# Patient Record
Sex: FEMALE | Race: BLACK OR AFRICAN AMERICAN | Employment: FULL TIME | URBAN - METROPOLITAN AREA
[De-identification: names, ages, dates, MRNs, and addresses within clinical notes are randomized per-mention and may not be internally consistent; named-entity substitution may affect disease eponyms.]

---

## 2021-05-26 ENCOUNTER — OFFICE VISIT (OUTPATIENT)
Dept: PRIMARY CARE CLINIC | Age: 46
End: 2021-05-26
Payer: COMMERCIAL

## 2021-05-26 VITALS
OXYGEN SATURATION: 97 % | DIASTOLIC BLOOD PRESSURE: 89 MMHG | TEMPERATURE: 97.8 F | RESPIRATION RATE: 18 BRPM | SYSTOLIC BLOOD PRESSURE: 134 MMHG | HEART RATE: 79 BPM | BODY MASS INDEX: 46 KG/M2 | HEIGHT: 63 IN | WEIGHT: 259.6 LBS

## 2021-05-26 DIAGNOSIS — Z12.31 ENCOUNTER FOR SCREENING MAMMOGRAM FOR MALIGNANT NEOPLASM OF BREAST: ICD-10-CM

## 2021-05-26 DIAGNOSIS — G43.009 MIGRAINE WITHOUT AURA AND WITHOUT STATUS MIGRAINOSUS, NOT INTRACTABLE: Primary | ICD-10-CM

## 2021-05-26 DIAGNOSIS — Z11.59 NEED FOR HEPATITIS C SCREENING TEST: ICD-10-CM

## 2021-05-26 DIAGNOSIS — E55.9 VITAMIN D DEFICIENCY: ICD-10-CM

## 2021-05-26 DIAGNOSIS — K59.04 CHRONIC IDIOPATHIC CONSTIPATION: ICD-10-CM

## 2021-05-26 DIAGNOSIS — E66.01 MORBIDLY OBESE (HCC): ICD-10-CM

## 2021-05-26 DIAGNOSIS — Z00.00 PHYSICAL EXAM: ICD-10-CM

## 2021-05-26 DIAGNOSIS — K21.9 GASTROESOPHAGEAL REFLUX DISEASE WITHOUT ESOPHAGITIS: ICD-10-CM

## 2021-05-26 PROBLEM — Z90.710 S/P HYSTERECTOMY: Status: ACTIVE | Noted: 2021-05-26

## 2021-05-26 PROCEDURE — 99204 OFFICE O/P NEW MOD 45 MIN: CPT | Performed by: INTERNAL MEDICINE

## 2021-05-26 RX ORDER — OMEPRAZOLE 40 MG/1
40 CAPSULE, DELAYED RELEASE ORAL DAILY
Qty: 30 CAPSULE | Refills: 0 | Status: SHIPPED | OUTPATIENT
Start: 2021-05-26 | End: 2021-06-23

## 2021-05-26 RX ORDER — TOPIRAMATE 25 MG/1
25 TABLET ORAL 2 TIMES DAILY
Qty: 60 TABLET | Refills: 0 | Status: SHIPPED | OUTPATIENT
Start: 2021-05-26 | End: 2021-06-23

## 2021-07-09 ENCOUNTER — HOSPITAL ENCOUNTER (OUTPATIENT)
Dept: MAMMOGRAPHY | Age: 46
Discharge: HOME | End: 2021-07-09
Payer: COMMERCIAL

## 2021-07-09 ENCOUNTER — TRANSCRIBE ORDER (OUTPATIENT)
Dept: GENERAL RADIOLOGY | Age: 46
End: 2021-07-09

## 2021-07-09 DIAGNOSIS — Z12.31 VISIT FOR SCREENING MAMMOGRAM: Primary | ICD-10-CM

## 2021-07-09 DIAGNOSIS — Z12.31 VISIT FOR SCREENING MAMMOGRAM: ICD-10-CM

## 2021-07-09 PROCEDURE — 77063 BREAST TOMOSYNTHESIS BI: CPT

## 2021-07-19 ENCOUNTER — VIRTUAL VISIT (OUTPATIENT)
Dept: PRIMARY CARE CLINIC | Age: 46
End: 2021-07-19
Payer: COMMERCIAL

## 2021-07-19 DIAGNOSIS — G43.009 MIGRAINE WITHOUT AURA AND WITHOUT STATUS MIGRAINOSUS, NOT INTRACTABLE: ICD-10-CM

## 2021-07-19 DIAGNOSIS — E66.01 MORBIDLY OBESE (HCC): ICD-10-CM

## 2021-07-19 DIAGNOSIS — F10.90 DRINKING PROBLEM: ICD-10-CM

## 2021-07-19 DIAGNOSIS — E55.9 VITAMIN D DEFICIENCY: ICD-10-CM

## 2021-07-19 DIAGNOSIS — R74.8 ELEVATED LIVER ENZYMES: Primary | ICD-10-CM

## 2021-07-19 DIAGNOSIS — R76.8 HEPATITIS C ANTIBODY TEST POSITIVE: ICD-10-CM

## 2021-07-19 PROCEDURE — 99214 OFFICE O/P EST MOD 30 MIN: CPT | Performed by: INTERNAL MEDICINE

## 2021-07-19 RX ORDER — ERGOCALCIFEROL 1.25 MG/1
50000 CAPSULE ORAL
Qty: 4 CAPSULE | Refills: 0 | Status: SHIPPED | OUTPATIENT
Start: 2021-07-19 | End: 2021-08-16

## 2021-07-19 NOTE — PROGRESS NOTES
Sigrid Cespedes (: 1975) is a 40 y.o. female, established patient, here for evaluation of the following chief complaint(s):   Follow-up   Written by Diana Moeller, as dictated by Dr. Jany Valera MD.      ASSESSMENT/PLAN:  Below is the assessment and plan developed based on review of pertinent labs, studies, and medications. 1. Elevated liver enzymes  D/c alcohol intake for 2 weeks. Will recheck metabolic panel to see if liver enzyme levels improve with alcohol reduction.  -     METABOLIC PANEL, COMPREHENSIVE; Future    2. Drinking problem  D/c alcohol intake for 2 weeks. Will recheck metabolic panel to see if liver enzyme levels improve with alcohol reduction. 3. Hepatitis C antibody test positive  Ordered lab work to check viral load of Hepatitis C. Waiting for results. Will consider tx if viral load is high.  -     HEPATITIS C QT BY PCR WITH REFLEX GENOTYPE; Future    4. Vitamin D deficiency  Prescribed ergocalciferol 1,250 mcg. Take medication as prescribed. Recommend taking a OTC Vitamin D supplement of 1,000 units daily after prescription finishes. Ordered lab work to re-check Vitamin D levels. Waiting for results. -     ergocalciferol (ERGOCALCIFEROL) 1,250 mcg (50,000 unit) capsule; Take 1 Capsule by mouth every seven (7) days for 4 doses. , Normal, Disp-4 Capsule, R-0 sent to pharmacy. -     VITAMIN D, 25 HYDROXY; Future    5. Morbidly obese ([facility] [address]  Advised pt to purchase a FitBit or similar device. Discussed that a healthy lifestyle requires 5,000-7,000 steps daily, but weight loss requires 10,000 steps daily. 6. Migraine without aura and without status migrainosus, not intractable  Continue taking Topamax 25 mg as prescribed for headaches. SUBJECTIVE/OBJECTIVE:  HPI    Patient presents today virtually to discuss lab results. Her most recent lab work on 21 showed ALT at 108, and AST at 115.  She has mixed drinks during social events, and was drinking 3x last week. She denies any urges to drinking alcohol. Her Hepatitis C test was reactive. Her Vitamin D level came  9.8. She c/o rapid weight gain recently. Her weight on 5/26/21 was 259 lb. She takes Topamax for migraines. She continues to get intermittent migraines, but they have reduced in intensity. Patient Active Problem List   Diagnosis Code    S/P hysterectomy Z90.710    Migraine without aura and without status migrainosus, not intractable G43.009    Chronic idiopathic constipation K59.04        Current Outpatient Medications on File Prior to Visit   Medication Sig Dispense Refill    topiramate (TOPAMAX) 25 mg tablet TAKE 1 TABLET BY MOUTH TWICE DAILY 90 Tablet 0    omeprazole (PRILOSEC) 40 mg capsule TAKE 1 CAPSULE BY MOUTH DAILY 30 Capsule 2     No current facility-administered medications on file prior to visit. No Known Allergies    No past medical history on file. No past surgical history on file. Family History   Problem Relation Age of Onset    Hypertension Father     Cancer Maternal Grandmother     Cancer Maternal Grandfather     Cancer Paternal Grandmother     Cancer Paternal Grandfather        Social History     Socioeconomic History    Marital status:      Spouse name: Not on file    Number of children: Not on file    Years of education: Not on file    Highest education level: Not on file   Occupational History    Not on file   Tobacco Use    Smoking status: Never Smoker    Smokeless tobacco: Never Used   Vaping Use    Vaping Use: Never used   Substance and Sexual Activity    Alcohol use:  Yes    Drug use: Never    Sexual activity: Not on file   Other Topics Concern    Not on file   Social History Narrative    Not on file     Social Determinants of Health     Financial Resource Strain:     Difficulty of Paying Living Expenses:    Food Insecurity:     Worried About Running Out of Food in the Last Year:     [address] in the Last Year: Transportation Needs:     Lack of Transportation (Medical):  Lack of Transportation (Non-Medical):    Physical Activity:     Days of Exercise per Week:     Minutes of Exercise per Session:    Stress:     Feeling of Stress :    Social Connections:     Frequency of Communication with Friends and Family:     Frequency of Social Gatherings with Friends and Family:     Attends Jain Services:     Active Member of Clubs or Organizations:     Attends Club or Organization Meetings:     Marital Status:    Intimate Partner Violence:     Fear of Current or Ex-Partner:     Emotionally Abused:     Physically Abused:     Sexually Abused:        Orders Only on 07/09/2021   Component Date Value Ref Range Status    Hepatitis C virus Ab 07/09/2021 >11.00  Index Final    Hep C virus Ab Interp. 07/09/2021 REACTIVE* NONREACTIVE   Final    Comment: Recommend equivocal and reactive anti-HCV results be further evaluated by a  supplemental or confirmatory test, such as quantitative  nucleic acid test for  HCV RNA, if clinically indicated.  Hep C  virus Ab comment 07/09/2021 Method used is Siemens Advia Centaur    Final    Vitamin D 25-Hydroxy 07/09/2021 9.8* 30 - 100 ng/mL Final    Comment: (NOTE)  Deficiency               <20 ng/mL  Insufficiency          20-30 ng/mL  Sufficient             ng/mL  Possible toxicity       >100 ng/mL    The Method used is Siemens Advia Centaur currently standardized to a   Center of Disease Control and Prevention (CDC) certified reference   [facility]. Samples containing fluorescein dye can produce falsely   elevated values when tested with the ADVIA Centaur Vitamin D Assay. It is recommended that results in the toxic range, >100 ng/mL, be   retested 72 hours post fluorescein exposure.       TSH 07/09/2021 2.29  0.36 - 3.74 uIU/mL Final    Comment:   Due to TSH heterogeneity, both structurally and degree of glycosylation,  monoclonal antibodies used in the TSH assay may not accurately quantitate TSH. Therefore, this result should be correlated with clinical findings as well as  with other assessments of thyroid function, e.g., free T4, free T3.  Cholesterol, total 07/09/2021 182  <200 MG/DL Final    Triglyceride 07/09/2021 107  <150 MG/DL Final    Comment: Based on NCEP-ATP III:  Triglycerides <150 mg/dL  is considered normal, 150-199  mg/dL  borderline high,  200-499 mg/dL high and  greater than or equal to 500  mg/dL very high.  HDL Cholesterol 07/09/2021 87  MG/DL Final    Comment: Based on NCEP ATP III, HDL Cholesterol <40 mg/dL is considered low and >60  mg/dL is elevated.       LDL, calculated 07/09/2021 73.6  0 - 100 MG/DL Final    Comment: Based on the NCEP-ATP: LDL-C concentrations are considered  optimal <100 mg/dL,  near optimal/above Normal 100-129 mg/dL Borderline High: 130-159, High: 160-189  mg/dL Very High: Greater than or equal to 190 mg/dL      VLDL, calculated 07/09/2021 21.4  MG/DL Final    CHOL/HDL Ratio 07/09/2021 2.1  0.0 - 5.0   Final    WBC 07/09/2021 5.3  3.6 - 11.0 K/uL Final    RBC 07/09/2021 4.27  3.80 - 5.20 M/uL Final    HGB 07/09/2021 13.2  11.5 - 16.0 g/dL Final    HCT 07/09/2021 40.2  35.0 - 47.0 % Final    MCV 07/09/2021 94.1  80.0 - 99.0 FL Final    MCH 07/09/2021 30.9  26.0 - 34.0 PG Final    MCHC 07/09/2021 32.8  30.0 - 36.5 g/dL Final    RDW 07/09/2021 13.2  11.5 - 14.5 % Final    PLATELET 41/88/8645 386  150 - 400 K/uL Final    MPV 07/09/2021 11.3  8.9 - 12.9 FL Final    NRBC 07/09/2021 0.0  0  WBC Final    ABSOLUTE NRBC 07/09/2021 0.00  0.00 - 0.01 K/uL Final    Sodium 07/09/2021 140  136 - 145 mmol/L Final    Potassium 07/09/2021 3.9  3.5 - 5.1 mmol/L Final    Chloride 07/09/2021 107  97 - 108 mmol/L Final    CO2 07/09/2021 26  21 - 32 mmol/L Final    Anion gap 07/09/2021 7  5 - 15 mmol/L Final    Glucose 07/09/2021 82  65 - 100 mg/dL Final    BUN 07/09/2021 12  6 - 20 MG/DL Final    Creatinine 07/09/2021 0.78  0.55 - 1.02 MG/DL Final    BUN/Creatinine ratio 07/09/2021 15  12 - 20   Final    GFR est AA 07/09/2021 >60  >60 ml/min/1.73m2 Final    GFR est non-AA 07/09/2021 >60  >60 ml/min/1.73m2 Final    Comment: Estimated GFR is calculated using the IDMS-traceable Modification of Diet in  Renal Disease (MDRD) Study equation, reported for both  Americans  (GFRAA) and non- Americans (GFRNA), and normalized to 1.73m2 body  surface area. The physician must decide which value applies to the patient.  Calcium 07/09/2021 9.2  8.5 - 10.1 MG/DL Final    Bilirubin, total 07/09/2021 0.4  0.2 - 1.0 MG/DL Final    ALT (SGPT) 07/09/2021 108* 12 - 78 U/L Final    AST (SGOT) 07/09/2021 115* 15 - 37 U/L Final    Alk. phosphatase 07/09/2021 96  45 - 117 U/L Final    Protein, total 07/09/2021 7.3  6.4 - 8.2 g/dL Final    Albumin 07/09/2021 3.8  3.5 - 5.0 g/dL Final    Globulin 07/09/2021 3.5  2.0 - 4.0 g/dL Final    A-G Ratio 07/09/2021 1.1  1.1 - 2.2   Final       Review of Systems   Constitutional: Positive for unexpected weight change. Negative for activity change and fatigue. HENT: Negative for congestion, hearing loss, rhinorrhea and sore throat. Eyes: Negative for discharge. Respiratory: Negative for cough, chest tightness and shortness of breath. Cardiovascular: Negative for leg swelling. Gastrointestinal: Negative for abdominal pain, constipation and diarrhea. Genitourinary: Negative for dysuria, flank pain, frequency and urgency. Musculoskeletal: Negative for arthralgias, back pain and myalgias. Skin: Negative for color change and rash. Neurological: Positive for headaches. Negative for dizziness and light-headedness. Psychiatric/Behavioral: Negative for dysphoric mood and sleep disturbance. The patient is not nervous/anxious.        Physical Exam    [INSTRUCTIONS:  \"[x]\" Indicates a positive item  \"[]\" Indicates a negative item  -- DELETE ALL ITEMS NOT EXAMINED]    Constitutional: [x] Appears well-developed and well-nourished [x] No apparent distress      [] Abnormal -     Mental status: [x] Alert and awake  [x] Oriented to person/place/time [x] Able to follow commands    [] Abnormal -     Eyes:   EOM    [x]  Normal    [] Abnormal -   Sclera  [x]  Normal    [] Abnormal -          Discharge [x]  None visible   [] Abnormal -     HENT: [x] Normocephalic, atraumatic  [] Abnormal -   [x] Mouth/Throat: Mucous membranes are moist    External Ears [x] Normal  [] Abnormal -    Neck: [x] No visualized mass [] Abnormal -     Pulmonary/Chest: [x] Respiratory effort normal   [x] No visualized signs of difficulty breathing or respiratory distress        [] Abnormal -      Musculoskeletal:   [x] Normal gait with no signs of ataxia         [x] Normal range of motion of neck        [] Abnormal -     Neurological:        [x] No Facial Asymmetry (Cranial nerve 7 motor function) (limited exam due to video visit)          [x] No gaze palsy        [] Abnormal -          Skin:        [x] No significant exanthematous lesions or discoloration noted on facial skin         [] Abnormal -            Psychiatric:       [x] Normal Affect [] Abnormal -        [x] No Hallucinations    Other pertinent observable physical exam findings:-            Neel Temple, was evaluated through a synchronous (real-time) audio-video encounter. The patient (or guardian if applicable) is aware that this is a billable service. Verbal consent to proceed has been obtained within the past 12 months. The visit was conducted pursuant to the emergency declaration under the [address] [place] [address] authority and the [employer] General Act. Patient identification was verified, and a caregiver was present when appropriate. The patient was located in a state where the provider was credentialed to provide care.       An electronic signature was used to authenticate this note.   -- Abida Dillard

## 2021-08-05 DIAGNOSIS — R76.8 HEPATITIS C ANTIBODY TEST POSITIVE: ICD-10-CM

## 2021-08-05 DIAGNOSIS — E55.9 VITAMIN D DEFICIENCY: ICD-10-CM

## 2021-08-05 DIAGNOSIS — R74.8 ELEVATED LIVER ENZYMES: ICD-10-CM

## 2021-08-05 LAB
25(OH)D3 SERPL-MCNC: 28.8 NG/ML (ref 30–100)
ALBUMIN SERPL-MCNC: 3.8 G/DL (ref 3.5–5)
ALBUMIN/GLOB SERPL: 1.1 {RATIO} (ref 1.1–2.2)
ALP SERPL-CCNC: 90 U/L (ref 45–117)
ALT SERPL-CCNC: 93 U/L (ref 12–78)
ANION GAP SERPL CALC-SCNC: 8 MMOL/L (ref 5–15)
AST SERPL-CCNC: 67 U/L (ref 15–37)
BILIRUB SERPL-MCNC: 0.4 MG/DL (ref 0.2–1)
BUN SERPL-MCNC: 9 MG/DL (ref 6–20)
BUN/CREAT SERPL: 10 (ref 12–20)
CALCIUM SERPL-MCNC: 9.1 MG/DL (ref 8.5–10.1)
CHLORIDE SERPL-SCNC: 106 MMOL/L (ref 97–108)
CO2 SERPL-SCNC: 26 MMOL/L (ref 21–32)
CREAT SERPL-MCNC: 0.9 MG/DL (ref 0.55–1.02)
GLOBULIN SER CALC-MCNC: 3.4 G/DL (ref 2–4)
GLUCOSE SERPL-MCNC: 81 MG/DL (ref 65–100)
POTASSIUM SERPL-SCNC: 3.8 MMOL/L (ref 3.5–5.1)
PROT SERPL-MCNC: 7.2 G/DL (ref 6.4–8.2)
SODIUM SERPL-SCNC: 140 MMOL/L (ref 136–145)

## 2021-08-07 LAB
HCV GENOTYPE: NORMAL
HCV GENTYP SERPL NAA+PROBE: NORMAL
HCV RNA SERPL NAA+PROBE-ACNC: NORMAL IU/ML
HCV RNA SERPL NAA+PROBE-ACNC: NORMAL IU/ML
HCV RNA SERPL NAA+PROBE-LOG IU: 5.99 LOG10 IU/ML
HCV RNA SERPL NAA+PROBE-LOG IU: NORMAL LOG10 IU/ML
PLEASE NOTE, 550474: NORMAL
TEST INFORMATION, 550045: NORMAL
TEST INFORMATION, 550045: NORMAL

## 2021-08-09 DIAGNOSIS — B17.10 ACUTE HEPATITIS C VIRUS INFECTION WITHOUT HEPATIC COMA: Primary | ICD-10-CM

## 2021-08-15 DIAGNOSIS — E55.9 VITAMIN D DEFICIENCY: ICD-10-CM

## 2021-08-16 RX ORDER — ERGOCALCIFEROL 1.25 MG/1
CAPSULE ORAL
Qty: 4 CAPSULE | Refills: 0 | Status: SHIPPED | OUTPATIENT
Start: 2021-08-16 | End: 2021-09-22

## 2021-08-22 DIAGNOSIS — G43.009 MIGRAINE WITHOUT AURA AND WITHOUT STATUS MIGRAINOSUS, NOT INTRACTABLE: ICD-10-CM

## 2021-08-22 RX ORDER — TOPIRAMATE 25 MG/1
TABLET ORAL
Qty: 90 TABLET | Refills: 0 | Status: SHIPPED | OUTPATIENT
Start: 2021-08-22 | End: 2021-08-25 | Stop reason: ALTCHOICE

## 2021-08-25 DIAGNOSIS — G43.009 MIGRAINE WITHOUT AURA AND WITHOUT STATUS MIGRAINOSUS, NOT INTRACTABLE: Primary | ICD-10-CM

## 2021-08-25 RX ORDER — TOPIRAMATE 50 MG/1
50 TABLET, FILM COATED ORAL 2 TIMES DAILY
Qty: 60 TABLET | Refills: 2 | Status: SHIPPED | OUTPATIENT
Start: 2021-08-25 | End: 2021-11-23

## 2021-09-22 DIAGNOSIS — E55.9 VITAMIN D DEFICIENCY: ICD-10-CM

## 2021-09-22 RX ORDER — ERGOCALCIFEROL 1.25 MG/1
CAPSULE ORAL
Qty: 4 CAPSULE | Refills: 0 | Status: SHIPPED | OUTPATIENT
Start: 2021-09-22 | End: 2022-10-12

## 2021-10-11 DIAGNOSIS — K21.9 GASTROESOPHAGEAL REFLUX DISEASE WITHOUT ESOPHAGITIS: ICD-10-CM

## 2021-10-11 RX ORDER — OMEPRAZOLE 40 MG/1
CAPSULE, DELAYED RELEASE ORAL
Qty: 30 CAPSULE | Refills: 2 | Status: SHIPPED | OUTPATIENT
Start: 2021-10-11 | End: 2022-02-28

## 2021-10-28 ENCOUNTER — OFFICE VISIT (OUTPATIENT)
Dept: HEMATOLOGY | Age: 46
End: 2021-10-28
Payer: COMMERCIAL

## 2021-10-28 VITALS
HEIGHT: 63 IN | SYSTOLIC BLOOD PRESSURE: 138 MMHG | HEART RATE: 67 BPM | WEIGHT: 251.4 LBS | BODY MASS INDEX: 44.54 KG/M2 | RESPIRATION RATE: 98 BRPM | DIASTOLIC BLOOD PRESSURE: 94 MMHG | TEMPERATURE: 96.7 F

## 2021-10-28 DIAGNOSIS — B18.2 CHRONIC HEPATITIS C WITHOUT HEPATIC COMA (HCC): Primary | ICD-10-CM

## 2021-10-28 LAB
ALBUMIN SERPL-MCNC: 3.8 G/DL (ref 3.5–5)
ALBUMIN/GLOB SERPL: 1 {RATIO} (ref 1.1–2.2)
ALP SERPL-CCNC: 84 U/L (ref 45–117)
ALT SERPL-CCNC: 105 U/L (ref 12–78)
ANION GAP SERPL CALC-SCNC: 4 MMOL/L (ref 5–15)
AST SERPL-CCNC: 64 U/L (ref 15–37)
BASOPHILS # BLD: 0 K/UL (ref 0–0.1)
BASOPHILS NFR BLD: 0 % (ref 0–1)
BILIRUB DIRECT SERPL-MCNC: 0.2 MG/DL (ref 0–0.2)
BILIRUB SERPL-MCNC: 0.4 MG/DL (ref 0.2–1)
BUN SERPL-MCNC: 8 MG/DL (ref 6–20)
BUN/CREAT SERPL: 8 (ref 12–20)
CALCIUM SERPL-MCNC: 9.9 MG/DL (ref 8.5–10.1)
CHLORIDE SERPL-SCNC: 109 MMOL/L (ref 97–108)
CO2 SERPL-SCNC: 27 MMOL/L (ref 21–32)
CREAT SERPL-MCNC: 0.96 MG/DL (ref 0.55–1.02)
DIFFERENTIAL METHOD BLD: NORMAL
EOSINOPHIL # BLD: 0 K/UL (ref 0–0.4)
EOSINOPHIL NFR BLD: 1 % (ref 0–7)
ERYTHROCYTE [DISTWIDTH] IN BLOOD BY AUTOMATED COUNT: 12.2 % (ref 11.5–14.5)
GLOBULIN SER CALC-MCNC: 3.9 G/DL (ref 2–4)
GLUCOSE SERPL-MCNC: 104 MG/DL (ref 65–100)
HBV SURFACE AB SER QL: NONREACTIVE
HBV SURFACE AB SER-ACNC: <3.1 MIU/ML
HBV SURFACE AG SER QL: 0.12 INDEX
HBV SURFACE AG SER QL: NEGATIVE
HCT VFR BLD AUTO: 43.5 % (ref 35–47)
HGB BLD-MCNC: 14 G/DL (ref 11.5–16)
IMM GRANULOCYTES # BLD AUTO: 0 K/UL (ref 0–0.04)
IMM GRANULOCYTES NFR BLD AUTO: 0 % (ref 0–0.5)
LYMPHOCYTES # BLD: 0.8 K/UL (ref 0.8–3.5)
LYMPHOCYTES NFR BLD: 21 % (ref 12–49)
MCH RBC QN AUTO: 30.6 PG (ref 26–34)
MCHC RBC AUTO-ENTMCNC: 32.2 G/DL (ref 30–36.5)
MCV RBC AUTO: 95.2 FL (ref 80–99)
MONOCYTES # BLD: 0.3 K/UL (ref 0–1)
MONOCYTES NFR BLD: 8 % (ref 5–13)
NEUTS SEG # BLD: 2.6 K/UL (ref 1.8–8)
NEUTS SEG NFR BLD: 70 % (ref 32–75)
NRBC # BLD: 0 K/UL (ref 0–0.01)
NRBC BLD-RTO: 0 PER 100 WBC
PLATELET # BLD AUTO: 348 K/UL (ref 150–400)
PMV BLD AUTO: 11 FL (ref 8.9–12.9)
POTASSIUM SERPL-SCNC: 4.4 MMOL/L (ref 3.5–5.1)
PROT SERPL-MCNC: 7.7 G/DL (ref 6.4–8.2)
RBC # BLD AUTO: 4.57 M/UL (ref 3.8–5.2)
RBC MORPH BLD: NORMAL
SODIUM SERPL-SCNC: 140 MMOL/L (ref 136–145)
WBC # BLD AUTO: 3.7 K/UL (ref 3.6–11)

## 2021-10-28 PROCEDURE — 99203 OFFICE O/P NEW LOW 30 MIN: CPT | Performed by: INTERNAL MEDICINE

## 2021-10-28 NOTE — Clinical Note
11/7/2021 Patient: Shayne Camp YOB: 1975 Date of Visit: 10/28/2021 Cortes Goodman MD 
[address] [place] [address] Via In [facility] [address] Dear Cortes Goodman MD, Thank you for referring Ms. Tito Salas  to [address] for evaluation. My notes for this consultation are attached. If you have questions, please do not hesitate to call me. I look forward to following your patient along with you. Sincerely, Kristina Vivas MD

## 2021-10-28 NOTE — PROGRESS NOTES
[facility] [address]      Lisa Hanley MD, Cira Harada, Melissa Lyons MD, MPH      Zahida Campuzano, SYLVIE Gonzalez, [facility]     April S Frantz, [facility]   Holger George, FNP-C    Ramonita Arreola, [facility]       Rosangela Clay Dmitri[address]    at [address], [address] [place] [address], [place]  22.    [phone]    FAX: [address] [place] [address]    at [address], [address], [address]    [phone]    FAX: [phone]       Patient Care Team:  Chino Harrison MD as PCP - General (Internal Medicine)  Chino Harrison MD as PCP - [facility]      Problem List  Date Reviewed: 10/28/2021        Codes Class Noted    Ch[facility] ([facility] ICD-10-CM: B18.2  ICD-9-CM: 070.54  10/28/2021        S/P hysterectomy ICD-10-CM: Z90.710  ICD-9-CM: V88.01  5/26/2021        Migraine without aura and without status migrainosus, not intractable ICD-10-CM: J72.356  ICD-9-CM: 346.10  5/26/2021        Chronic idiopathic constipation ICD-10-CM: K59.04  ICD-9-CM: 564.00  5/26/2021              The clinicians listed above have asked me to see Michelle Keita in consultation regarding chronic HCV and its management. All medical records sent by the referring physicians were reviewed     The patient is a 40 y.o. Black female who was found to have abnormalities in liver chemistries and subsequently tested positive for chronic HCV in 7/2021. Risk factors for acquiring HCV are not apparent. There was no history of acute icteric hepatitis at the time of these risk factors. Imaging of the liver was not performed. An assessment of liver fibrosis with biopsy or elastography has not been performed. The patient has never received treatment for chronic HCV.       The patient does not have any symptoms which could be attributed to the liver disorder. The patient is not experiencing the following symptoms which are commonly seen in this liver disorder:   fatigue,   pain in the right side over the liver,     The patient completes all daily activities without any functional limitations. ASSESSMENT AND PLAN:  Chronic HCV   Chronic HCV with of unclear severity. Liver transaminases are elevated. ALP is normal.  Liver function is normal.  The platelet count is normal.      Based upon laboratory studies and imaging the patient does not appear to have advanced liver disease. Have performed laboratory testing to monitor liver function and degree of liver injury. This included BMP, hepatic panel, CBC with platelet count, INR. Will perform and/or review results of HCV viral load, HCV genotype to define the specific treatment and duration of treatment that will be required. Will perform  serologic and virologic studies to assess for other causes of chronic liver disease. Will perform imaging of the liver with ultrasound. The need to perform an assessment of liver fibrosis was discussed with the patient. The Fibroscan can assess liver fibrosis and determine if a patient has advanced fibrosis or cirrhosis without the need for liver biopsy. This will be performed at the next office visit. Chronic HCV Treatment  The patient has not been treated for HCV. The patient has HCV genotype 1A. The patient should be treated with Harvoni (sofosbuvir and ledipasvir), Mavyret (glecaprevir and piprentasvir) or Epclusa (sofosbuvir and velpatasvir). The SVR/cure rate for is over 95%. Screening for Hepatocellular Carcinoma  HCC screening is not necessary if the patient has no evidence of cirrhosis.     Treatment of other medical problems in patients with chronic liver disease  There are no contraindications for the patient to take most medications that are necessary for treatment of other medical issues. Counseling for alcohol in patients with chronic liver disease  The patient was counseled regarding alcohol consumption and the effect of alcohol on chronic liver disease. The patient does not consume any significant amount of alcohol. Vaccinations   Vaccination for viral hepatitis A and B is recommended since the patient has no serologic evidence of previous exposure or vaccination with immunity. The patient has received 2 doses of COVID-19 vaccine. Routine vaccinations against other bacterial and viral agents can be performed as indicated. Annual flu vaccination should be administered if indicated. ALLERGIES  No Known Allergies    MEDICATIONS  Current Outpatient Medications   Medication Sig    omeprazole (PRILOSEC) 40 mg capsule TAKE 1 CAPSULE BY MOUTH DAILY    ergocalciferol (ERGOCALCIFEROL) 1,250 mcg (50,000 unit) capsule TAKE 1 CAPSULE BY MOUTH EVERY 7 DAYS FOR 4 DOSES    topiramate (TOPAMAX) 50 mg tablet Take 1 Tablet by mouth two (2) times a day for 90 days. No current facility-administered medications for this visit. SYSTEM REVIEW NOT RELATED TO LIVER DISEASE OR REVIEWED ABOVE:  Constitution systems: Negative for fever, chills, weight gain, weight loss. Eyes: Negative for visual changes. ENT: Negative for sore throat, painful swallowing. Respiratory: Negative for cough, hemoptysis, SOB. Cardiology: Negative for chest pain, palpitations. GI:  Negative for constipation or diarrhea. : Negative for urinary frequency, dysuria, hematuria, nocturia. Skin: Negative for rash. Hematology: Negative for easy bruising, blood clots. Musculo-skelatal: Negative for back pain, muscle pain, weakness. Neurologic: Negative for headaches, dizziness, vertigo, memory problems not related to HE. Psychology: Negative for anxiety, depression. FAMILY HISTORY:  The father  Has/had the following following chronic disease(s): None.     The mother Has/had the following chronic disease(s): None. There is no family history of liver disease. SOCIAL HISTORY:  The patient is . The spouse has not been tested for HCV   The patient has 2 children,   The patient has never used tobacco products. The patient consumes 6 alcoholic beverages per week. The patient has been abstinent from alcohol since 9/2021. The patient currently works full time for [employer]. PHYSICAL EXAMINATION:  Visit Vitals  BP (!) 138/94 (BP 1 Location: Left arm, BP Patient Position: Sitting, BP Cuff Size: Adult)   Pulse 67   Temp (!) 96.7 °F (35.9 °C) (Temporal)   Resp (!) 98   Ht 5' 3\" (1.6 m)   Wt 251 lb 6.4 oz (114 kg)   BMI 44.53 kg/m²     General: No acute distress. Eyes: Sclera anicteric. ENT: No oral lesions. Thyroid normal.  Nodes: No adenopathy. Skin: No spider angiomata. No jaundice. No palmar erythema. Respiratory: Lungs clear to auscultation. Cardiovascular: Regular heart rate. No murmurs. No JVD. Abdomen: Soft non-tender. Liver size normal to percussion/palpation. Spleen not palpable. No obvious ascites. Extremities: No edema. No muscle wasting. No gross arthritic changes. Neurologic: Alert and oriented. Cranial nerves grossly intact. No asterixis.     LABORATORY STUDIES:  Liver [place] [address] Units 10/28/2021 8/5/2021   WBC 3.6 - 11.0 K/uL 3.7    ANC 1.8 - 8.0 K/UL 2.6    HGB 11.5 - 16.0 g/dL 14.0     - 400 K/uL 348    AST 15 - 37 U/L 64 (H) 67 (H)   ALT 12 - 78 U/L 105 (H) 93 (H)   Alk Phos 45 - 117 U/L 84 90   Bili, Total 0.2 - 1.0 MG/DL 0.4 0.4   Bili, Direct 0.0 - 0.2 MG/DL 0.2    Albumin 3.5 - 5.0 g/dL 3.8 3.8   BUN 6 - 20 MG/DL 8 9   Creat 0.55 - 1.02 MG/DL 0.96 0.90   Na 136 - 145 mmol/L 140 140   K 3.5 - 5.1 mmol/L 4.4 3.8   Cl 97 - 108 mmol/L 109 (H) 106   CO2 21 - 32 mmol/L 27 26   Glucose 65 - 100 mg/dL 104 (H) 81     SEROLOGIES:  Serologies Latest Ref Rng & Units 8/5/2021 7/9/2021   Hep C Ab NONREACTIVE    REACTIVE (A)   HCV RT-PCR, Quant IU/mL 984,000    HCV Log10 log10 IU/mL 5.993      Serologies Latest Ref Rng & Units 10/28/2021   Hep A Ab, Total Negative   Negative   Hep B Surface Ag Index 0.12   Hep B Surface Ag Interp Negative   Negative   Hep B Core Ab, Total Negative   Negative   Hep B Surface Ab mIU/mL <3.10   Hep B Surface Ab Interp NONREACTIVE   NONREACTIVE   Hep C Ab NONREACTIVE      Hep C Genotype  1a   HCV RT-PCR, Quant IU/mL 993,000     LIVER HISTOLOGY:  Not available or performed    ENDOSCOPIC PROCEDURES:  Not available or performed    RADIOLOGY:  11/2021. Ultrasound of liver. Normal appearing liver. No liver mass lesions. OTHER TESTING:  Not available or performed    FOLLOW-UP:  All of the issues listed above in the Assessment and Plan were discussed with the patient. All questions were answered. The patient expressed a clear understanding of the above. [address] [place] [address] in 4 weeks for Fibroscan to review all data and determine the treatment plan.       MD Rayna [facility]Gr[facility] of [address] [facility] 22.  [phone]  [address] [place] [address]

## 2021-10-29 LAB
HAV AB SER QL IA: NEGATIVE
HBV CORE AB SERPL QL IA: NEGATIVE

## 2021-10-31 LAB
HCV RNA SERPL NAA+PROBE-ACNC: NORMAL IU/ML
HCV RNA SERPL NAA+PROBE-LOG IU: 6 LOG10 IU/ML

## 2021-11-01 LAB
HCV GENTYP SERPL NAA+PROBE: NORMAL
HCV RNA SERPL QL NAA+PROBE: POSITIVE
PLEASE NOTE, 550474: NORMAL

## 2021-11-03 ENCOUNTER — HOSPITAL ENCOUNTER (OUTPATIENT)
Dept: ULTRASOUND IMAGING | Age: 46
Discharge: HOME | End: 2021-11-03
Attending: INTERNAL MEDICINE
Payer: COMMERCIAL

## 2021-11-03 DIAGNOSIS — B18.2 CHRONIC HEPATITIS C WITHOUT HEPATIC COMA (HCC): ICD-10-CM

## 2021-11-03 PROCEDURE — 76700 US EXAM ABDOM COMPLETE: CPT

## 2021-11-23 DIAGNOSIS — G43.009 MIGRAINE WITHOUT AURA AND WITHOUT STATUS MIGRAINOSUS, NOT INTRACTABLE: ICD-10-CM

## 2021-11-23 RX ORDER — TOPIRAMATE 50 MG/1
TABLET, FILM COATED ORAL
Qty: 60 TABLET | Refills: 2 | Status: SHIPPED | OUTPATIENT
Start: 2021-11-23 | End: 2022-04-25 | Stop reason: SDUPTHER

## 2021-11-24 NOTE — PROGRESS NOTES
Chief Complaint   Patient presents with    New Patient     elevated liver enzymes/hep c     1. Have you been to the ER, urgent care clinic since your last visit? Hospitalized since your last visit? No    2. Have you seen or consulted any other health care providers outside of the [address] [place] [address] since your last visit? Include any pap smears or colon screening.  No     Visit Vitals  BP (!) 138/94 (BP 1 Location: Left arm, BP Patient Position: Sitting, BP Cuff Size: Adult)   Pulse 67   Temp (!) 96.7 °F (35.9 °C) (Temporal)   Resp (!) 98   Ht 5' 3\" (1.6 m)   Wt 251 lb 6.4 oz (114 kg)   BMI 44.53 kg/m² C/O ABDOMINAL PAIN

## 2022-01-31 ENCOUNTER — OFFICE VISIT (OUTPATIENT)
Dept: HEMATOLOGY | Age: 47
End: 2022-01-31
Payer: COMMERCIAL

## 2022-01-31 VITALS
BODY MASS INDEX: 43.41 KG/M2 | DIASTOLIC BLOOD PRESSURE: 81 MMHG | HEART RATE: 72 BPM | OXYGEN SATURATION: 99 % | SYSTOLIC BLOOD PRESSURE: 132 MMHG | HEIGHT: 63 IN | RESPIRATION RATE: 17 BRPM | WEIGHT: 245 LBS | TEMPERATURE: 97 F

## 2022-01-31 DIAGNOSIS — B18.2 CHRONIC HEPATITIS C WITHOUT HEPATIC COMA (HCC): Primary | ICD-10-CM

## 2022-01-31 PROCEDURE — 91200 LIVER ELASTOGRAPHY: CPT | Performed by: NURSE PRACTITIONER

## 2022-01-31 PROCEDURE — 99214 OFFICE O/P EST MOD 30 MIN: CPT | Performed by: NURSE PRACTITIONER

## 2022-01-31 RX ORDER — GLECAPREVIR AND PIBRENTASVIR 40; 100 MG/1; MG/1
3 TABLET, FILM COATED ORAL DAILY
Qty: 84 TABLET | Refills: 1 | Status: SHIPPED | OUTPATIENT
Start: 2022-01-31 | End: 2022-02-03 | Stop reason: SDUPTHER

## 2022-01-31 RX ORDER — ESTRADIOL 10 UG/1
INSERT VAGINAL
COMMUNITY
Start: 2022-01-05

## 2022-01-31 NOTE — PROGRESS NOTES
Identified pt with two pt identifiers(name and ). Reviewed record in preparation for visit and have obtained necessary documentation. Chief Complaint   Patient presents with    Hepatitis C     4 week Fibroscan f/u      Vitals:    22 1355   BP: 132/81   Pulse: 72   Resp: 17   Temp: 97 °F (36.1 °C)   TempSrc: Temporal   SpO2: 99%   Weight: 245 lb (111.1 kg)   Height: 5' 3\" (1.6 m)   PainSc:   0 - No pain       Health Maintenance Review: Patient reminded of \"due or due soon\" health maintenance. I have asked the patient to contact his/her primary care provider (PCP) for follow-up on his/her health maintenance. Coordination of Care Questionnaire:  :   1) Have you been to an emergency room, urgent care, or hospitalized since your last visit? If yes, where when, and reason for visit? no       2. Have seen or consulted any other health care provider since your last visit? If yes, where when, and reason for visit? NO      Patient is accompanied by self I have received verbal consent from [facility] to discuss any/all medical information while they are present in the room.

## 2022-01-31 NOTE — PROGRESS NOTES
[facility], MD, [address], [place], [place]      Ced SYLVIE Aleman    El [place], ACNP-BC     Ienz Landry, [facility]   MAMIE SpencerP-YOVANA Elkins, [facility]       Rosangela Clay [facility] 136    at [address], [address] Camilla Guidry  22.    [phone]    FAX: [address] [place] [address], [address]    [phone]    FAX: [phone]     Patient Care Team:  Nathaniel Sparrow MD as PCP - General (Internal Medicine)  Nathaniel Sparrow MD as PCP - [facility]    Problem List  Date Reviewed: 10/28/2021          Codes Class Noted    Chronic hepatitis C ([facility] ICD-10-CM: B18.2  ICD-9-CM: 070.54  10/28/2021        S/P hysterectomy ICD-10-CM: Z90.710  ICD-9-CM: V88.01  5/26/2021        Migraine without aura and without status migrainosus, not intractable ICD-10-CM: Y14.399  ICD-9-CM: 346.10  5/26/2021        Chronic idiopathic constipation ICD-10-CM: K59.04  ICD-9-CM: 564.00  5/26/2021            Barbara Hansen is being seen at [address] for management of chronic HCV. The active problem list, all pertinent past medical history, medications, radiologic findings and laboratory findings related to the liver disorder were reviewed and discussed with the patient. The patient is a 40 y.o. Black female who was found to have abnormalities in liver chemistries and subsequently tested positive for chronic HCV in 7/2021. An assessment of liver fibrosis with elastography will be performed during this office visit. The patient does not have any symptoms which could be attributed to the liver disorder.     The patient is not experiencing the following symptoms which are commonly seen in this liver disorder: fatigue, pain in the right side over the liver. The patient completes all daily activities without any functional limitations. She started at 89 and has been losing weight. Today she is at 245. ASSESSMENT AND PLAN:  Chronic HCV   Chronic HCV with portal fibrosis    Liver transaminases are elevated. ALP is normal. Liver function is normal. The platelet count is normal.      Have performed laboratory testing to monitor liver function and degree of liver injury. This included BMP, hepatic panel, CBC with platelet count and INR. Chronic HCV treatment  The patient has not been treated for HCV. The patient has HCV genotype 1A. Will order 8 weeks of [address]. Discussed the specialty pharmacy role and expectations about approval and delivery of the medication. Screening for hepatocellular carcinoma  HCC screening is not necessary if the patient has no evidence of cirrhosis. Counseling for diet and weight loss in patients with confirmed or suspected NAFLD  The patient was counseled regarding diet and exercise to achieve weight loss. The best diet for patients with fatty liver is one very low in carbohydrates and enriched with protein such as an Elda's program. This was discussed in detail and a handout was provided. The patient was told not to consume any food products and drinks containing fructose as this enhances hepatic fat synthesis. There is no medication or vitamin supplements we advocate for BARRAGAN. Using glitazones in patients without diabetes mellitus has been shown to reduce fat content in the liver but has no effect on fibrosis and is associated with weight gain. Vitamin E has also been used but the data is not very good and most experts no longer advocate this. Treatment of other medical problems in patients with chronic liver disease  There are no contraindications for the patient to take most medications necessary for treatment of other medical issues.     Counseling for alcohol in patients with chronic liver disease  The patient was counseled regarding alcohol consumption and the effect of alcohol on chronic liver disease. The patient does not consume any significant amount of alcohol. Vaccinations   Vaccination for viral hepatitis A and B is recommended since the patient has no serologic evidence of previous exposure or vaccination with immunity. The patient has received 2 doses of COVID-19 vaccine. Routine vaccinations against other bacterial and viral agents can be performed as indicated. Annual flu vaccination should be administered if indicated. ALLERGIES  No Known Allergies    MEDICATIONS  Current Outpatient Medications   Medication Sig    estradioL (VAGIFEM) 10 mcg tab vaginal tablet INSERT 1 TABLET VAGINALLY 2 TIMES A WEEK    topiramate (TOPAMAX) 50 mg tablet TAKE 1 TABLET BY MOUTH TWICE DAILY    omeprazole (PRILOSEC) 40 mg capsule TAKE 1 CAPSULE BY MOUTH DAILY    ergocalciferol (ERGOCALCIFEROL) 1,250 mcg (50,000 unit) capsule TAKE 1 CAPSULE BY MOUTH EVERY 7 DAYS FOR 4 DOSES (Patient not taking: Reported on 1/31/2022)     No current facility-administered medications for this visit. FAMILY HISTORY:  The father has/had the following chronic disease(s): none. The mother has/had the following chronic disease(s): none. There is no family history of liver disease. SOCIAL HISTORY:  The patient is . The spouse has not been tested for HCV   The patient has 2 children. The patient has never used tobacco products. The patient consumes 6 alcoholic beverages per week. The patient has been abstinent from alcohol since 9/2021. The patient currently works full time for [employer].       PHYSICAL EXAMINATION:  Visit Vitals  /81 (BP 1 Location: Left upper arm, BP Patient Position: Sitting, BP Cuff Size: Large adult)   Pulse 72   Temp 97 °F (36.1 °C) (Temporal)   Resp 17   Ht 5' 3\" (1.6 m)   Wt 245 lb (111.1 kg)   SpO2 99%   BMI 43.40 kg/m²     General: No acute distress. Obese. Eyes: Sclera anicteric. ENT: No oral lesions. Nodes: No adenopathy. Skin: No spider angiomata. No jaundice. No palmar erythema. Respiratory: Lungs clear to auscultation. Cardiovascular: Regular heart rate. No murmurs. No JVD. Abdomen: Soft non-tender, liver size normal to percussion/palpation. Spleen not palpable. No obvious ascites. Extremities: No edema. No muscle wasting. No gross arthritic changes. Neurologic: Alert and oriented. Cranial nerves grossly intact. No asterixis. LABORATORY STUDIES:  Liver [place] of [address] [place] [address] & Units 10/28/2021 8/5/2021   WBC 3.6 - 11.0 K/uL 3.7    ANC 1.8 - 8.0 K/UL 2.6    HGB 11.5 - 16.0 g/dL 14.0     - 400 K/uL 348    AST 15 - 37 U/L 64 (H) 67 (H)   ALT 12 - 78 U/L 105 (H) 93 (H)   Alk Phos 45 - 117 U/L 84 90   Bili, Total 0.2 - 1.0 MG/DL 0.4 0.4   Bili, Direct 0.0 - 0.2 MG/DL 0.2    Albumin 3.5 - 5.0 g/dL 3.8 3.8   BUN 6 - 20 MG/DL 8 9   Creat 0.55 - 1.02 MG/DL 0.96 0.90   Na 136 - 145 mmol/L 140 140   K 3.5 - 5.1 mmol/L 4.4 3.8   Cl 97 - 108 mmol/L 109 (H) 106   CO2 21 - 32 mmol/L 27 26   Glucose 65 - 100 mg/dL 104 (H) 81     SEROLOGIES:  Serologies Latest Ref Rng & Units 8/5/2021 7/9/2021   Hep C Ab NONREACTIVE    REACTIVE (A)   HCV RT-PCR, Quant IU/mL 984,000    HCV Log10 log10 IU/mL 5.993      Serologies Latest Ref Rng & Units 10/28/2021   Hep A Ab, Total Negative   Negative   Hep B Surface Ag Index 0.12   Hep B Surface Ag Interp Negative   Negative   Hep B Core Ab, Total Negative   Negative   Hep B Surface Ab mIU/mL <3.10   Hep B Surface Ab Interp NONREACTIVE   NONREACTIVE   Hep C Ab NONREACTIVE      Hep C Genotype  1a   HCV RT-PCR, Quant IU/mL 993,000     LIVER HISTOLOGY:  1/2022. FibroScan performed at [address]. EkPa was 6.0. IQR/med 13%. . The results suggested a fibrosis level of F1. The CAP score suggests fatty liver.     ENDOSCOPIC PROCEDURES:  Not available or performed    RADIOLOGY:  11/2021. Ultrasound of liver. Normal appearing liver. No liver mass lesions. OTHER TESTING:  Not available or performed    FOLLOW-UP:  All of the issues listed above in the assessment and plan were discussed with the patient. All questions were answered. The patient expressed a clear understanding of the above. [facility] 87 4 weeks after initiation of medical therapy. The patient was advised to call the office when she receives her medication to provide us with her start date and to schedule her 4 week treatment follow up appointment. Because of saturated schedules, this will be a phone visit only and okay to overbook.     Adrianne Freeman, [facility]  Liver [place] [address], [address] Camilla Guidry  22.  [phone]

## 2022-02-03 RX ORDER — GLECAPREVIR AND PIBRENTASVIR 40; 100 MG/1; MG/1
3 TABLET, FILM COATED ORAL DAILY
Qty: 84 TABLET | Refills: 1 | Status: SHIPPED | OUTPATIENT
Start: 2022-02-03 | End: 2022-10-12 | Stop reason: ALTCHOICE

## 2022-02-28 DIAGNOSIS — K21.9 GASTROESOPHAGEAL REFLUX DISEASE WITHOUT ESOPHAGITIS: ICD-10-CM

## 2022-02-28 RX ORDER — OMEPRAZOLE 40 MG/1
CAPSULE, DELAYED RELEASE ORAL
Qty: 30 CAPSULE | Refills: 2 | Status: SHIPPED | OUTPATIENT
Start: 2022-02-28 | End: 2022-06-30

## 2022-03-03 ENCOUNTER — VIRTUAL VISIT (OUTPATIENT)
Dept: HEMATOLOGY | Age: 47
End: 2022-03-03
Payer: COMMERCIAL

## 2022-03-03 DIAGNOSIS — B18.2 CHRONIC HEPATITIS C WITHOUT HEPATIC COMA (HCC): Primary | ICD-10-CM

## 2022-03-03 PROCEDURE — 99214 OFFICE O/P EST MOD 30 MIN: CPT | Performed by: NURSE PRACTITIONER

## 2022-03-03 NOTE — Clinical Note
NOTIFICATION RETURN TO WORK / SCHOOL    3/3/2022 4:22 PM    Ms. [facility]  [address]  [place] [address]      To Whom It May Concern:    [facility] is currently under the care of [address]. She will return to work/school on: ***    If there are questions or concerns please have the patient contact our office. Sincerely,      Milvia Abbott.  Dung Wayne NP movement

## 2022-03-03 NOTE — PROGRESS NOTES
Identified pt with two pt identifiers(name and ). Reviewed record in preparation for visit and have obtained necessary documentation. Chief Complaint   Patient presents with    Hepatitis C     4 week HCV f/u      There were no vitals filed for this visit. Health Maintenance Review: Patient reminded of \"due or due soon\" health maintenance. I have asked the patient to contact his/her primary care provider (PCP) for follow-up on his/her health maintenance. Coordination of Care Questionnaire:  :   1) Have you been to an emergency room, urgent care, or hospitalized since your last visit? If yes, where when, and reason for visit? no       2. Have seen or consulted any other health care provider since your last visit? If yes, where when, and reason for visit? NO      Patient is accompanied by self I have received verbal consent from [facility] to discuss any/all medical information while they are present in the room.

## 2022-03-04 ENCOUNTER — PATIENT MESSAGE (OUTPATIENT)
Dept: HEMATOLOGY | Age: 47
End: 2022-03-04

## 2022-03-07 ENCOUNTER — DOCUMENTATION ONLY (OUTPATIENT)
Dept: HEMATOLOGY | Age: 47
End: 2022-03-07

## 2022-03-07 NOTE — PROGRESS NOTES
[address] [facility], MD, [address], [place] [facility], [place]      Pedro Pablo Keys, SYLVIE [facility]anna [facility]     Inez Landry, [facility]   STACI Reyes-YOVANA Chapman, [facility]       Rosangela Clay Dmitriy De Acevedo 136    at [address], [address] Camilla Guidry  22.    [phone]    FAX: [address] [place] [address]    at [address], [address], [address]    [phone]    FAX: [phone]     Patient Care Team:  Saima Guerrero MD as PCP - General (Internal Medicine)  Saima Guerrero MD as PCP - [facility] Provider    Problem List  Date Reviewed: 10/28/2021          Codes Class Noted    Chronic hepatitis C ([facility] ICD-10-CM: B18.2  ICD-9-CM: 070.54  10/28/2021        S/P hysterectomy ICD-10-CM: Z90.710  ICD-9-CM: V88.01  5/26/2021        Migraine without aura and without status migrainosus, not intractable ICD-10-CM: S53.427  ICD-9-CM: 346.10  5/26/2021        Chronic idiopathic constipation ICD-10-CM: K59.04  ICD-9-CM: 564.00  5/26/2021            Patrice Bautista is a 56 y.o. female, evaluated via audio-only technology on 3/3/2022 for Hepatitis C (4 week HCV f/u)  . Assessment & Plan:   Chronic HCV. On mavyret. Tolerating well. Check labs today. FU 4 months. 12  Subjective:   I'm feeling good. Prior to Admission medications    Medication Sig Start Date End Date Taking? Authorizing Provider   omeprazole (PRILOSEC) 40 mg capsule TAKE 1 CAPSULE BY MOUTH DAILY 2/28/22  Yes Saima Guerrero MD   glecaprevir-pibrentasvir (Mavyret) 100-40 mg tab Take 3 Tablets by mouth daily.  Indications: chronic infection of genotype 1 hepatitis C virus 2/3/22  Yes Caroline Hoops., NP   estradioL (VAGIFEM) 10 mcg tab vaginal tablet INSERT 1 TABLET VAGINALLY 2 TIMES A WEEK 1/5/22  Yes Provider, Historical   topiramate (TOPAMAX) 50 mg tablet TAKE 1 TABLET BY MOUTH TWICE DAILY 11/23/21  Yes Bridger Gavin MD   ergocalciferol (ERGOCALCIFEROL) 1,250 mcg (50,000 unit) capsule TAKE 1 CAPSULE BY MOUTH EVERY 7 DAYS FOR 4 DOSES  Patient not taking: Reported on 1/31/2022 9/22/21   Bridger Gavin MD     ROS    No data recorded     Ian Rodriguez was evaluated through a patient-initiated, synchronous (real-time) audio only encounter. She (or guardian if applicable) is aware that it is a billable service, which includes applicable co-pays, with coverage as determined by her insurance carrier. This visit was conducted with the patient's (and/or Verlie Caller guardian's) verbal consent. She has not had a related appointment within my department in the past 7 days or scheduled within the next 24 hours. The patient was located in a state where the provider was licensed to provide care. Total Time: 22 minutes    MIAN Hand is [facility] at [facility] for management of chronic HCV. The active problem list, all pertinent past medical history, medications, radiologic findings and laboratory findings related to the liver disorder were reviewed and discussed with the patient. The patient is a 56 y.o. Black female who was found to have abnormalities in liver chemistries and subsequently tested positive for chronic HCV in 7/2021. The patient does not have any symptoms which could be attributed to the liver disorder. The patient is not experiencing the following symptoms which are commonly seen in this liver disorder: fatigue, pain in the right side over the liver. The patient completes all daily activities without any functional limitations. She started at 89 and has been losing weight. ASSESSMENT AND PLAN:  Chronic HCV   Chronic HCV with portal fibrosis    Liver transaminases are elevated.  ALP is normal. Liver function is normal. The platelet count is normal.      Have performed laboratory testing to monitor liver function and degree of liver injury. This included BMP, hepatic panel, CBC with platelet count and INR. Chronic HCV treatment  On 8 weeks of Mavyret. Screening for hepatocellular carcinoma  HCC screening is not necessary if the patient has no evidence of cirrhosis. Counseling for diet and weight loss in patients with confirmed or suspected NAFLD  The patient was counseled regarding diet and exercise to achieve weight loss. The best diet for patients with fatty liver is one very low in carbohydrates and enriched with protein such as an Elda's program. This was discussed in detail and a handout was provided. The patient was told not to consume any food products and drinks containing fructose as this enhances hepatic fat synthesis. There is no medication or vitamin supplements we advocate for [place]. Using glitazones in patients without diabetes mellitus has been shown to reduce fat content in the liver but has no effect on fibrosis and is associated with weight gain. Vitamin E has also been used but the data is not very good and most experts no longer advocate this. Treatment of other medical problems in patients with chronic liver disease  There are no contraindications for the patient to take most medications necessary for treatment of other medical issues. Counseling for alcohol in patients with chronic liver disease  The patient was counseled regarding alcohol consumption and the effect of alcohol on chronic liver disease. The patient does not consume any significant amount of alcohol. Vaccinations   Vaccination for viral hepatitis A and B is recommended since the patient has no serologic evidence of previous exposure or vaccination with immunity. The patient has received 2 doses of COVID-19 vaccine. Routine vaccinations against other bacterial and viral agents can be performed as indicated.   Annual flu vaccination should be administered if indicated. ALLERGIES  No Known Allergies    MEDICATIONS  Current Outpatient Medications   Medication Sig    omeprazole (PRILOSEC) 40 mg capsule TAKE 1 CAPSULE BY MOUTH DAILY    glecaprevir-pibrentasvir (Mavyret) 100-40 mg tab Take 3 Tablets by mouth daily. Indications: chronic infection of genotype 1 hepatitis C virus    estradioL (VAGIFEM) 10 mcg tab vaginal tablet INSERT 1 TABLET VAGINALLY 2 TIMES A WEEK    topiramate (TOPAMAX) 50 mg tablet TAKE 1 TABLET BY MOUTH TWICE DAILY    ergocalciferol (ERGOCALCIFEROL) 1,250 mcg (50,000 unit) capsule TAKE 1 CAPSULE BY MOUTH EVERY 7 DAYS FOR 4 DOSES (Patient not taking: Reported on 1/31/2022)     No current facility-administered medications for this visit. FAMILY HISTORY:  The father has/had the following chronic disease(s): none. The mother has/had the following chronic disease(s): none. There is no family history of liver disease. SOCIAL HISTORY:  The patient is . The spouse has not been tested for HCV   The patient has 2 children. The patient has never used tobacco products. The patient consumes 6 alcoholic beverages per week. The patient has been abstinent from alcohol since 9/2021. The patient currently works full time for [employer].       PHYSICAL EXAMINATION:  No physical exam    LABORATORY STUDIES:  Liver [place] of [address] & Units 10/28/2021 8/5/2021   WBC 3.6 - 11.0 K/uL 3.7    ANC 1.8 - 8.0 K/UL 2.6    HGB 11.5 - 16.0 g/dL 14.0     - 400 K/uL 348    AST 15 - 37 U/L 64 (H) 67 (H)   ALT 12 - 78 U/L 105 (H) 93 (H)   Alk Phos 45 - 117 U/L 84 90   Bili, Total 0.2 - 1.0 MG/DL 0.4 0.4   Bili, Direct 0.0 - 0.2 MG/DL 0.2    Albumin 3.5 - 5.0 g/dL 3.8 3.8   BUN 6 - 20 MG/DL 8 9   Creat 0.55 - 1.02 MG/DL 0.96 0.90   Na 136 - 145 mmol/L 140 140   K 3.5 - 5.1 mmol/L 4.4 3.8   Cl 97 - 108 mmol/L 109 (H) 106   CO2 21 - 32 mmol/L 27 26   Glucose 65 - 100 mg/dL 104 (H) 81     SEROLOGIES:  Serologies Latest Ref Rng & Units 8/5/2021 7/9/2021   Hep C Ab NONREACTIVE    REACTIVE (A)   HCV RT-PCR, Quant IU/mL 984,000    HCV Log10 log10 IU/mL 5.993      Serologies Latest Ref Rng & Units 10/28/2021   Hep A Ab, Total Negative   Negative   Hep B Surface Ag Index 0.12   Hep B Surface Ag Interp Negative   Negative   Hep B Core Ab, Total Negative   Negative   Hep B Surface Ab mIU/mL <3.10   Hep B Surface Ab Interp NONREACTIVE   NONREACTIVE   Hep C Ab NONREACTIVE      Hep C Genotype  1a   HCV RT-PCR, Quant IU/mL 993,000     LIVER HISTOLOGY:  1/2022. [facility]. EkPa was 6.0. IQR/med 13%. . The results suggested a fibrosis level of F1. The CAP score suggests fatty liver. ENDOSCOPIC PROCEDURES:  Not available or performed    RADIOLOGY:  11/2021. Ultrasound of liver. Normal appearing liver. No liver mass lesions. OTHER TESTING:  Not available or performed    FOLLOW-UP:  All of the issues listed above in the assessment and plan were discussed with the patient. All questions were answered. The patient expressed a clear understanding of the above. Follow up in 4 months. Labs today.     Stephanie Tna, [facility] Hospital[facility]  Liver [facility] [address] [employer] [place], [address] [place]  22.  [phone]

## 2022-03-12 LAB
ALBUMIN SERPL-MCNC: 4.4 G/DL (ref 3.8–4.8)
ALP SERPL-CCNC: 91 IU/L (ref 44–121)
ALT SERPL-CCNC: 18 IU/L (ref 0–32)
AST SERPL-CCNC: 18 IU/L (ref 0–40)
BILIRUB DIRECT SERPL-MCNC: <0.1 MG/DL (ref 0–0.4)
BILIRUB SERPL-MCNC: 0.3 MG/DL (ref 0–1.2)
BUN SERPL-MCNC: 10 MG/DL (ref 6–24)
BUN/CREAT SERPL: 11 (ref 9–23)
CALCIUM SERPL-MCNC: 9.3 MG/DL (ref 8.7–10.2)
CHLORIDE SERPL-SCNC: 104 MMOL/L (ref 96–106)
CO2 SERPL-SCNC: 21 MMOL/L (ref 20–29)
CREAT SERPL-MCNC: 0.89 MG/DL (ref 0.57–1)
EGFR: 81 ML/MIN/1.73
ERYTHROCYTE [DISTWIDTH] IN BLOOD BY AUTOMATED COUNT: 12.8 % (ref 11.7–15.4)
GLUCOSE SERPL-MCNC: 87 MG/DL (ref 65–99)
HCT VFR BLD AUTO: 39.5 % (ref 34–46.6)
HGB BLD-MCNC: 13.4 G/DL (ref 11.1–15.9)
MCH RBC QN AUTO: 30.9 PG (ref 26.6–33)
MCHC RBC AUTO-ENTMCNC: 33.9 G/DL (ref 31.5–35.7)
MCV RBC AUTO: 91 FL (ref 79–97)
PLATELET # BLD AUTO: 373 X10E3/UL (ref 150–450)
POTASSIUM SERPL-SCNC: 4.2 MMOL/L (ref 3.5–5.2)
PROT SERPL-MCNC: 7.4 G/DL (ref 6–8.5)
RBC # BLD AUTO: 4.34 X10E6/UL (ref 3.77–5.28)
SODIUM SERPL-SCNC: 141 MMOL/L (ref 134–144)
WBC # BLD AUTO: 6.2 X10E3/UL (ref 3.4–10.8)

## 2022-03-15 LAB
DIAGNOSTIC IMP SPEC-IMP: NORMAL
HCV AB S/CO SERPL IA: >11 S/CO RATIO (ref 0–0.9)
HCV RNA SERPL NAA+PROBE-ACNC: NORMAL IU/ML
REF LAB TEST REF RANGE: NORMAL

## 2022-04-25 DIAGNOSIS — G43.009 MIGRAINE WITHOUT AURA AND WITHOUT STATUS MIGRAINOSUS, NOT INTRACTABLE: ICD-10-CM

## 2022-04-26 RX ORDER — TOPIRAMATE 50 MG/1
50 TABLET, FILM COATED ORAL 2 TIMES DAILY
Qty: 180 TABLET | Refills: 0 | Status: SHIPPED | OUTPATIENT
Start: 2022-04-26 | End: 2022-08-01

## 2022-08-02 ENCOUNTER — DOCUMENTATION ONLY (OUTPATIENT)
Dept: HEMATOLOGY | Age: 47
End: 2022-08-02

## 2022-08-02 NOTE — PROGRESS NOTES
LM at [phone] asking patient to call to schedule follow up appt with April. Called mother at [phone] in  attempt to reach message.  Mother stated, she is not here and phone disconnected 8/2/22 mj

## 2022-10-12 ENCOUNTER — OFFICE VISIT (OUTPATIENT)
Dept: HEMATOLOGY | Age: 47
End: 2022-10-12
Payer: COMMERCIAL

## 2022-10-12 VITALS
SYSTOLIC BLOOD PRESSURE: 142 MMHG | OXYGEN SATURATION: 98 % | BODY MASS INDEX: 44.47 KG/M2 | HEIGHT: 63 IN | WEIGHT: 251 LBS | DIASTOLIC BLOOD PRESSURE: 81 MMHG | TEMPERATURE: 97.3 F | HEART RATE: 81 BPM

## 2022-10-12 DIAGNOSIS — B18.2 CHRONIC HEPATITIS C WITHOUT HEPATIC COMA (HCC): Primary | ICD-10-CM

## 2022-10-12 PROCEDURE — 99213 OFFICE O/P EST LOW 20 MIN: CPT | Performed by: NURSE PRACTITIONER

## 2022-10-12 NOTE — PROGRESS NOTES
Identified pt with two pt identifiers(name and ). Reviewed record in preparation for visit and have obtained necessary documentation. Chief Complaint   Patient presents with    Follow-up      Vitals:    10/12/22 1446   BP: (!) 142/81   Pulse: 81   Temp: 97.3 °F (36.3 °C)   TempSrc: Temporal   SpO2: 98%   Weight: 251 lb (113.9 kg)   Height: 5' 3\" (1.6 m)   PainSc:   0 - No pain       Health Maintenance Review: Patient reminded of \"due or due soon\" health maintenance. I have asked the patient to contact his/her primary care provider (PCP) for follow-up on his/her health maintenance. Coordination of Care Questionnaire:  :   1) Have you been to an emergency room, urgent care, or hospitalized since your last visit? If yes, where when, and reason for visit? no       2. Have seen or consulted any other health care provider since your last visit? If yes, where when, and reason for visit? NO      Patient is accompanied by self I have received verbal consent from [facility] to discuss any/all medical information while they are present in the room.

## 2022-10-12 NOTE — PROGRESS NOTES
[address] [facility] [place], [place], [address], [place], [place]      Papa Sack, PA-C Fidela Cogan, ACNP-BC     April S Frantz, [facility]   STACI Vickers-YOVANA Hernández, [facility]       Rosangela Clay Dmitriy [address]    at [facility]    [address] [facility], [address] [facility]    [address] [facility] 22.    [phone]    FAX: [address] [place] [address]    at [address], [address]    [phone]    FAX: [phone]     Patient Care Team:  Bhavna Raines MD as PCP - General (Internal Medicine Physician)  Bhavna Raines MD as PCP - [facility] Provider    Problem List  Date Reviewed: 10/28/2021            Codes Class Noted    Chronic [facility] ([facility] ICD-10-CM: B18.2  ICD-9-CM: 070.54  10/28/2021        S/P hysterectomy ICD-10-CM: Z90.710  ICD-9-CM: V88.01  5/26/2021        Migraine without aura and without status migrainosus, not intractable ICD-10-CM: B52.998  ICD-9-CM: 346.10  5/26/2021        Chronic idiopathic constipation ICD-10-CM: K59.04  ICD-9-CM: 564.00  5/26/2021           Michaelle Mercedes is being seen at [facility] for management of chronic HCV. The active problem list, all pertinent past medical history, medications, radiologic findings and laboratory findings related to the liver disorder were reviewed and discussed with the patient. The patient is a 56 y.o. Black female who was found to have abnormalities in liver chemistries and subsequently tested positive for chronic HCV in 7/2021. Assessment of liver fibrosis was performed with Fibroscan 1/2022. The result was 6.0 kPa which correlates with stage 1 portal fibrosis. The CAP score of 276 suggests hepatic steatosis.     The patient completed an intended 8 weeks of hepatitis C treatment with [facility] [place] 2/3/2022-4/2022. The 4 week HCV RNA was undetectable. She returns for testing to determine if the patient is a sustained viral responder and cured. The patient does not have any symptoms which could be attributed to the liver disorder. The patient is not experiencing the following symptoms which are commonly seen in this liver disorder: fatigue, pain in the right side over the liver. The patient completes all daily activities without any functional limitations. ASSESSMENT AND PLAN:  Chronic HCV   Chronic HCV with portal fibrosis    Assessment of liver fibrosis was performed with Fibroscan 1/2022. The result was 6.0 kPa which correlates with stage 1 portal fibrosis. The CAP score of 276 suggests hepatic steatosis. Have performed laboratory testing to monitor liver function and degree of liver injury. This included BMP, hepatic panel, and CBC with platelet count. Laboratory testing from 3/11/2022 reviewed in detail. Follow-up testing ordered today. The liver transaminases, ALP, liver function and platelet count are normal.     Chronic HCV treatment  The patient completed an intended 8 weeks of Mavyret 2/03/2022-4/2022. The 4 week HCV RNA was undetectable. An HCV RNA will be ordered today. If undetectable, she will be considered a sustained viral responder. She wishes to have 1 final check in 6 months to be sure she is cured. Screening for hepatocellular carcinoma  HCC screening is not necessary if the patient has no evidence of cirrhosis. Counseling for diet and weight loss in patients with confirmed or suspected NAFLD  The patient was counseled regarding diet and exercise to achieve weight loss. The best diet for patients with fatty liver is one very low in carbohydrates and enriched with protein such as an Elda's program. This was discussed in detail and a handout was provided.      The patient was told not to consume any food products and drinks containing fructose as this enhances hepatic fat synthesis. There is no medication or vitamin supplements we advocate for [place]. Using glitazones in patients without diabetes mellitus has been shown to reduce fat content in the liver but has no effect on fibrosis and is associated with weight gain. Vitamin E has also been used but the data is not very good and most experts no longer advocate this. Treatment of other medical problems in patients with chronic liver disease  There are no contraindications for the patient to take most medications necessary for treatment of other medical issues. Counseling for alcohol in patients with chronic liver disease  The patient was counseled regarding alcohol consumption and the effect of alcohol on chronic liver disease. The patient does not consume any significant amount of alcohol. Vaccinations   Vaccination for viral hepatitis A and B is recommended since the patient has no serologic evidence of previous exposure or vaccination with immunity. The patient has received 2 doses of COVID-19 vaccine. Routine vaccinations against other bacterial and viral agents can be performed as indicated. Annual flu vaccination should be administered if indicated. ALLERGIES  No Known Allergies    MEDICATIONS  Current Outpatient Medications   Medication Sig    omeprazole (PRILOSEC) 40 mg capsule TAKE 1 CAPSULE BY MOUTH DAILY    topiramate (TOPAMAX) 50 mg tablet TAKE 1 TABLET BY MOUTH TWICE DAILY    glecaprevir-pibrentasvir (Mavyret) 100-40 mg tab Take 3 Tablets by mouth daily. Indications: chronic infection of genotype 1 hepatitis C virus    estradioL (VAGIFEM) 10 mcg tab vaginal tablet INSERT 1 TABLET VAGINALLY 2 TIMES A WEEK    ergocalciferol (ERGOCALCIFEROL) 1,250 mcg (50,000 unit) capsule TAKE 1 CAPSULE BY MOUTH EVERY 7 DAYS FOR 4 DOSES (Patient not taking: Reported on 1/31/2022)     No current facility-administered medications for this visit.      SYSTEM REVIEW NOT RELATED TO LIVER DISEASE OR REVIEWED ABOVE:  Constitution systems: Negative for fever, chills, weight gain, weight loss. Eyes: Negative for visual changes. ENT: Negative for sore throat, painful swallowing. Respiratory: Negative for cough, hemoptysis, SOB. Cardiology: Negative for chest pain, palpitations. GI:  Negative for constipation or diarrhea. : Negative for urinary frequency, dysuria, hematuria, nocturia. Skin: Negative for rash. Hematology: Negative for easy bruising, blood clots. Musculo-skeletal: Negative for back pain, muscle pain, weakness. Neurologic: Negative for headaches, dizziness, vertigo, memory problems not related to HE. Psychology: Negative for anxiety, depression. FAMILY HISTORY:  The father has/had the following chronic disease(s): none. The mother has/had the following chronic disease(s): none. There is no family history of liver disease. SOCIAL HISTORY:  The patient is . The spouse has not been tested for HCV   The patient has 2 children. The patient has never used tobacco products. The patient consumes 6 alcoholic beverages per week. The patient has been abstinent from alcohol since 9/2021. The patient currently works full time for [employer]. PHYSICAL EXAMINATION:  Visit Vitals  BP (!) 142/81 (BP 1 Location: Left upper arm, BP Patient Position: Sitting, BP Cuff Size: Adult)   Pulse 81   Temp 97.3 °F (36.3 °C) (Temporal)   Ht 5' 3\" (1.6 m)   Wt 251 lb (113.9 kg)   SpO2 98%   BMI 44.46 kg/m²       General: No acute distress. Eyes: Sclera anicteric. ENT: No oral lesions. Thyroid normal.  Nodes: No adenopathy. Skin: No spider angiomata. No jaundice. No palmar erythema. Respiratory: Lungs clear to auscultation. Cardiovascular: Regular heart rate. No murmurs. No JVD. Abdomen: Soft non-tender, liver size normal to percussion/palpation. Spleen not palpable. No obvious ascites. Extremities: No edema. No muscle wasting.   No gross arthritic changes. Neurologic: Alert and oriented. Cranial nerves grossly intact. No asterixis. LABORATORY STUDIES:  [facility] [place] of [address] [place] [address] Units 10/12/2022 3/11/2022   WBC 3.4 - 10.8 x10E3/uL  6.2   ANC 1.8 - 8.0 K/UL     HGB 11.1 - 15.9 g/dL  13.4    - 450 x10E3/uL  373   AST 15 - 37 U/L 8 (L) 18   ALT 12 - 78 U/L 21 18   Alk Phos 45 - 117 U/L 82 91   Bili, Total 0.2 - 1.0 MG/DL 0.2 0.3   Bili, Direct 0.0 - 0.2 MG/DL <0.1 <0.10   Albumin 3.5 - 5.0 g/dL 3.8 4.4   BUN 6 - 20 MG/DL 15 10   Creat 0.55 - 1.02 MG/DL 0.92 0.89   Na 136 - 145 mmol/L 141 141   K 3.5 - 5.1 mmol/L 4.2 4.2   Cl 97 - 108 mmol/L 110 (H) 104   CO2 21 - 32 mmol/L 26 21   Glucose 65 - 100 mg/dL 94 87     Liver [facility] Latest Ref Rng & Units 10/28/2021   WBC 3.4 - 10.8 x10E3/uL 3.7   ANC 1.8 - 8.0 K/UL 2.6   HGB 11.1 - 15.9 g/dL 14.0    - 450 x10E3/uL 348   AST 15 - 37 U/L 64 (H)   ALT 12 - 78 U/L 105 (H)   Alk Phos 45 - 117 U/L 84   Bili, Total 0.2 - 1.0 MG/DL 0.4   Bili, Direct 0.0 - 0.2 MG/DL 0.2   Albumin 3.5 - 5.0 g/dL 3.8   BUN 6 - 20 MG/DL 8   Creat 0.55 - 1.02 MG/DL 0.96   Na 136 - 145 mmol/L 140   K 3.5 - 5.1 mmol/L 4.4   Cl 97 - 108 mmol/L 109 (H)   CO2 21 - 32 mmol/L 27   Glucose 65 - 100 mg/dL 104 (H)     Laboratory testing from 3/11/2022 reviewed in detail. Additional testing included to evaluate progression or regression of disease. Laboratory testing results from today will be communicated by My Chart.        SEROLOGIES:  Serologies Latest Ref Rng & Units 8/5/2021 7/9/2021   Hep C Ab NONREACTIVE    REACTIVE (A)   HCV RT-PCR, Quant IU/mL 984,000    HCV Log10 log10 IU/mL 5.993      Serologies Latest Ref Rng & Units 10/28/2021   Hep A Ab, Total Negative   Negative   Hep B Surface Ag Index 0.12   Hep B Surface Ag Interp Negative   Negative   Hep B Core Ab, Total Negative   Negative   Hep B Surface Ab mIU/mL <3.10   Hep B Surface Ab Interp NONREACTIVE   NONREACTIVE   Hep C Ab NONREACTIVE Hep C Genotype  1a   HCV RT-PCR, Quant IU/mL 993,000     3/2022. HCV RNA undetectable. 10/2022. HCV RNA undetectable. LIVER HISTOLOGY:  1/2022. [facility]. EkPa was 6.0. IQR/med 13%. . The results suggested a fibrosis level of F1. The CAP score suggests fatty liver. ENDOSCOPIC PROCEDURES:  Not available or performed    RADIOLOGY:  11/2021. Ultrasound of liver. Normal appearing liver. No liver mass lesions. OTHER TESTING:  Not available or performed    FOLLOW-UP:  All of the issues listed above in the Assessment and Plan were discussed with the patient. All questions were answered. The patient expressed a clear understanding of the above. [address] [facility] 87 in 6 months to determine if she is cured of hepatitis C. BETO [facility] of [address] [id number] Joseph Freitas, [address] [facility] 22.  [address] [facility] [address]

## 2022-10-13 LAB
ALBUMIN SERPL-MCNC: 3.8 G/DL (ref 3.5–5)
ALBUMIN/GLOB SERPL: 1 {RATIO} (ref 1.1–2.2)
ALP SERPL-CCNC: 82 U/L (ref 45–117)
ALT SERPL-CCNC: 21 U/L (ref 12–78)
ANION GAP SERPL CALC-SCNC: 5 MMOL/L (ref 5–15)
AST SERPL-CCNC: 8 U/L (ref 15–37)
BILIRUB DIRECT SERPL-MCNC: <0.1 MG/DL (ref 0–0.2)
BILIRUB SERPL-MCNC: 0.2 MG/DL (ref 0.2–1)
BUN SERPL-MCNC: 15 MG/DL (ref 6–20)
BUN/CREAT SERPL: 16 (ref 12–20)
CALCIUM SERPL-MCNC: 9 MG/DL (ref 8.5–10.1)
CHLORIDE SERPL-SCNC: 110 MMOL/L (ref 97–108)
CO2 SERPL-SCNC: 26 MMOL/L (ref 21–32)
CREAT SERPL-MCNC: 0.92 MG/DL (ref 0.55–1.02)
GLOBULIN SER CALC-MCNC: 3.8 G/DL (ref 2–4)
GLUCOSE SERPL-MCNC: 94 MG/DL (ref 65–100)
POTASSIUM SERPL-SCNC: 4.2 MMOL/L (ref 3.5–5.1)
PROT SERPL-MCNC: 7.6 G/DL (ref 6.4–8.2)
SODIUM SERPL-SCNC: 141 MMOL/L (ref 136–145)

## 2022-10-14 LAB
HCV RNA SERPL NAA+PROBE-ACNC: NORMAL IU/ML
TEST INFORMATION: NORMAL

## 2022-10-17 NOTE — PROGRESS NOTES
Letter sent via my chart regarding the lab results which show an undetectable hepatitis C and normal liver enzymes.  I will see her back for 1 final check to be sure she is cured per patient request.

## 2022-11-14 ENCOUNTER — TRANSCRIBE ORDER (OUTPATIENT)
Dept: SCHEDULING | Age: 47
End: 2022-11-14

## 2022-11-14 DIAGNOSIS — Z12.31 SCREENING MAMMOGRAM FOR HIGH-RISK PATIENT: Primary | ICD-10-CM

## 2022-12-05 DIAGNOSIS — K21.9 GASTROESOPHAGEAL REFLUX DISEASE WITHOUT ESOPHAGITIS: ICD-10-CM

## 2022-12-05 RX ORDER — OMEPRAZOLE 40 MG/1
40 CAPSULE, DELAYED RELEASE ORAL DAILY
Qty: 30 CAPSULE | Refills: 0 | Status: SHIPPED | OUTPATIENT
Start: 2022-12-05

## 2022-12-05 RX ORDER — OMEPRAZOLE 40 MG/1
40 CAPSULE, DELAYED RELEASE ORAL DAILY
Qty: 30 CAPSULE | Refills: 0 | Status: SHIPPED | OUTPATIENT
Start: 2022-12-05 | End: 2022-12-05 | Stop reason: SDUPTHER

## 2023-02-22 DIAGNOSIS — K21.9 GASTROESOPHAGEAL REFLUX DISEASE WITHOUT ESOPHAGITIS: ICD-10-CM

## 2023-02-24 RX ORDER — OMEPRAZOLE 40 MG/1
40 CAPSULE, DELAYED RELEASE ORAL DAILY
Qty: 30 CAPSULE | Refills: 0 | Status: SHIPPED | OUTPATIENT
Start: 2023-02-24

## 2023-02-24 NOTE — TELEPHONE ENCOUNTER
PCP: John Contreras MD    Last appt: 7/19/2021  Future Appointments   Date Time Provider Pranav Batista   3/23/2023  3:00 PM SPT MAMMO 1 SPTMAMMO SPT   4/12/2023  2:30 PM Inez Landry, NP LIVR BS AMB       Requested Prescriptions     Pending Prescriptions Disp Refills    omeprazole (PRILOSEC) 40 mg capsule 30 Capsule 0     Sig: Take 1 Capsule by mouth daily.          Other Comments:

## 2023-06-05 NOTE — PROGRESS NOTES
This does not sound like a medication issue unless she is having more poor conduct than this one event. I am happy to try and see her sooner to discuss and we have telehealth options for therapy and Dena may have availability that she can see her soon for an evaluation and we can collaborate about medication options?  Tio Dennis (: 1975) is a 40 y.o. female, new patient, here for evaluation of the following chief complaint(s):  Establish Care (labs)     Written by Kavita Sandoval, as dictated by Dr. Ruperto Delatorre MD.      ASSESSMENT/PLAN:  Below is the assessment and plan developed based on review of pertinent history, physical exam, labs, studies, and medications. 1. Migraine without aura and without status migrainosus, not intractable  Will restart her on Topamax 25mg BID for migraines. Potential side effects were discussed. -     topiramate (TOPAMAX) 25 mg tablet; Take 1 Tablet by mouth two (2) times a day for 30 days. , Normal, Disp-60 Tablet, R-0 sent to pharmacy. 2. Chronic idiopathic constipation  Recommend she drink plenty of water throughout the day. 3. Physical exam  Complete physical exam done today. Ordered routine labs for the patient to complete when fasting.   -     METABOLIC PANEL, COMPREHENSIVE; Future  -     CBC W/O DIFF; Future  -     LIPID PANEL; Future  -     TSH 3RD GENERATION; Future    4. Need for hepatitis C screening test  Will check hepatitis C screening test with fasting labs. -     HEPATITIS C AB; Future    5. Morbidly obese ([address]  Explained that she should be walking 5,000 steps daily to maintain conditioning and weight and increase to at least 10,000 steps to work on losing weight. Discussed that Topamax can also help with cravings. 6. Vitamin D deficiency  Will check vitamin D level with fasting labs. -     VITAMIN D, 25 HYDROXY; Future    7. Encounter for screening mammogram for malignant neoplasm of breast  Screening mammogram ordered today. -     [facility] 3D MARTY W MAMMO BI SCREENING INCL CAD; Future    8. Gastroesophageal reflux disease without esophagitis  Will start her on Prilosec 40mg daily for 30 days. Potential side effects were discussed. -     omeprazole (PRILOSEC) 40 mg capsule; Take 1 Capsule by mouth daily for 30 days. , Normal, Disp-30 Capsule, R-0 sent to pharmacy. SUBJECTIVE/OBJECTIVE:  HPI  The patient presents today to establish care. She has not been previously followed by a PCP. She was previously on Topamax for migraine headaches. Since stopping the Topamax she continues to have headaches, but episodes are not frequent. She reports only having a BM around 2x per week. She is on Xyzal for seasonal allergies which works well in controlling her allergies. She notes sxs of postnasal drip during allergy season. She gets sxs of reflux and takes TUMs regularly which do not help much. She had a hysterectomy in 2008. Her gynecological care is managed by OB/GYN. She started getting screening mammograms at age 29 and is due for repeat. Patient Active Problem List   Diagnosis Code    S/P hysterectomy Z90.710    Migraine without aura and without status migrainosus, not intractable G43.009    Chronic idiopathic constipation K59.04        No current outpatient medications on file prior to visit. No current facility-administered medications on file prior to visit. No Known Allergies    History reviewed. No pertinent past medical history. History reviewed. No pertinent surgical history. Family History   Problem Relation Age of Onset    Hypertension Father     Cancer Maternal Grandmother     Cancer Maternal Grandfather     Cancer Paternal Grandmother     Cancer Paternal Grandfather        Social History     Socioeconomic History    Marital status:      Spouse name: Not on file    Number of children: Not on file    Years of education: Not on file    Highest education level: Not on file   Occupational History    Not on file   Tobacco Use    Smoking status: Never Smoker    Smokeless tobacco: Never Used   Vaping Use    Vaping Use: Never used   Substance and Sexual Activity    Alcohol use:  Yes    Drug use: Never    Sexual activity: Not on file   Other Topics Concern    Not on file   Social History Narrative    Not on file     Social Determinants of Health     Financial Resource Strain:     Difficulty of Paying Living Expenses:    Food Insecurity:     Worried About Running Out of Food in the Last Year:     [address] in the Last Year:    Transportation Needs:     Lack of Transportation (Medical):  Lack of Transportation (Non-Medical):    Physical Activity:     Days of Exercise per Week:     Minutes of Exercise per Session:    Stress:     Feeling of Stress :    Social Connections:     Frequency of Communication with Friends and Family:     Frequency of Social Gatherings with Friends and Family:     Attends [employer] Services:     Active Member of Clubs or Organizations:     Attends Club or Organization Meetings:     Marital Status:    Intimate Partner Violence:     Fear of Current or Ex-Partner:     Emotionally Abused:     Physically Abused:     Sexually Abused:        No visits with results within 3 Month(s) from this visit. Latest known visit with results is:   No results found for any previous visit. Review of Systems   Constitutional: Negative for activity change, fatigue and unexpected weight change. HENT: Negative for congestion, hearing loss, rhinorrhea and sore throat. Eyes: Negative for discharge. Respiratory: Negative for cough, chest tightness and shortness of breath. Cardiovascular: Negative for leg swelling. Gastrointestinal: Positive for constipation. Negative for abdominal pain and diarrhea. Genitourinary: Negative for dysuria, flank pain, frequency and urgency. Musculoskeletal: Negative for arthralgias, back pain and myalgias. Skin: Negative for color change and rash. Allergic/Immunologic: Positive for environmental allergies. Neurological: Positive for headaches. Negative for dizziness and light-headedness. Psychiatric/Behavioral: Negative for dysphoric mood and sleep disturbance. The patient is not nervous/anxious.       Visit Vitals  /89 (BP 1 Location: Right arm, BP Patient Position: Sitting)   Pulse 79   Temp 97.8 °F (36.6 °C) (Temporal)   Resp 18   Ht 5' 3\" (1.6 m)   Wt 259 lb 9.6 oz (117.8 kg)   SpO2 97%   BMI 45.99 kg/m²      Physical Exam  Vitals and nursing note reviewed. Constitutional:       General: She is not in acute distress. Appearance: Normal appearance. She is morbidly obese. She is not diaphoretic. HENT:      Right Ear: Tympanic membrane, ear canal and external ear normal.      Left Ear: Tympanic membrane, ear canal and external ear normal.      Mouth/Throat:      Mouth: Mucous membranes are moist.      Pharynx: Oropharynx is clear. Eyes:      General:         Right eye: No discharge. Left eye: No discharge. Extraocular Movements: Extraocular movements intact. Conjunctiva/sclera: Conjunctivae normal.   Neck:      Thyroid: No thyromegaly. Cardiovascular:      Rate and Rhythm: Normal rate and regular rhythm. Pulses: Normal pulses. Dorsalis pedis pulses are 2+ on the right side and 2+ on the left side. Pulmonary:      Effort: Pulmonary effort is normal.      Breath sounds: Normal breath sounds. No wheezing. Abdominal:      General: Bowel sounds are normal. There is no distension. Palpations: Abdomen is soft. Tenderness: There is no abdominal tenderness. Musculoskeletal:      Right lower leg: Edema (1+ pitting) present. Left lower leg: Edema (1+ pitting) present. Comments: B/L knees without crepitus   Lymphadenopathy:      Cervical: No cervical adenopathy. Neurological:      Deep Tendon Reflexes:      Reflex Scores:       Patellar reflexes are 2+ on the right side and 2+ on the left side. Psychiatric:         Mood and Affect: Mood and affect normal.         An electronic signature was used to authenticate this note.   -- Min Man